# Patient Record
Sex: FEMALE | Race: OTHER | URBAN - METROPOLITAN AREA
[De-identification: names, ages, dates, MRNs, and addresses within clinical notes are randomized per-mention and may not be internally consistent; named-entity substitution may affect disease eponyms.]

---

## 2021-12-13 ENCOUNTER — EMERGENCY (EMERGENCY)
Facility: HOSPITAL | Age: 56
LOS: 0 days | Discharge: HOME | End: 2021-12-14
Attending: EMERGENCY MEDICINE
Payer: MEDICAID

## 2021-12-13 VITALS — WEIGHT: 130.07 LBS | HEIGHT: 61 IN

## 2021-12-13 DIAGNOSIS — Z98.891 HISTORY OF UTERINE SCAR FROM PREVIOUS SURGERY: Chronic | ICD-10-CM

## 2021-12-13 DIAGNOSIS — K31.89 OTHER DISEASES OF STOMACH AND DUODENUM: ICD-10-CM

## 2021-12-13 DIAGNOSIS — R07.9 CHEST PAIN, UNSPECIFIED: ICD-10-CM

## 2021-12-13 DIAGNOSIS — R11.2 NAUSEA WITH VOMITING, UNSPECIFIED: ICD-10-CM

## 2021-12-13 DIAGNOSIS — Z20.822 CONTACT WITH AND (SUSPECTED) EXPOSURE TO COVID-19: ICD-10-CM

## 2021-12-13 DIAGNOSIS — R10.13 EPIGASTRIC PAIN: ICD-10-CM

## 2021-12-13 PROCEDURE — 36415 COLL VENOUS BLD VENIPUNCTURE: CPT

## 2021-12-13 PROCEDURE — 96374 THER/PROPH/DIAG INJ IV PUSH: CPT | Mod: XU

## 2021-12-13 PROCEDURE — 81001 URINALYSIS AUTO W/SCOPE: CPT

## 2021-12-13 PROCEDURE — 99285 EMERGENCY DEPT VISIT HI MDM: CPT

## 2021-12-13 PROCEDURE — 87186 SC STD MICRODIL/AGAR DIL: CPT

## 2021-12-13 PROCEDURE — 74177 CT ABD & PELVIS W/CONTRAST: CPT | Mod: MA

## 2021-12-13 PROCEDURE — 99284 EMERGENCY DEPT VISIT MOD MDM: CPT | Mod: 25

## 2021-12-13 PROCEDURE — 76705 ECHO EXAM OF ABDOMEN: CPT | Mod: 26

## 2021-12-13 PROCEDURE — 80053 COMPREHEN METABOLIC PANEL: CPT

## 2021-12-13 PROCEDURE — 96375 TX/PRO/DX INJ NEW DRUG ADDON: CPT

## 2021-12-13 PROCEDURE — 87086 URINE CULTURE/COLONY COUNT: CPT

## 2021-12-13 PROCEDURE — 85025 COMPLETE CBC W/AUTO DIFF WBC: CPT

## 2021-12-13 PROCEDURE — U0003: CPT

## 2021-12-13 PROCEDURE — 93005 ELECTROCARDIOGRAM TRACING: CPT

## 2021-12-13 PROCEDURE — U0005: CPT

## 2021-12-13 PROCEDURE — 76705 ECHO EXAM OF ABDOMEN: CPT

## 2021-12-13 PROCEDURE — 93010 ELECTROCARDIOGRAM REPORT: CPT

## 2021-12-13 PROCEDURE — 74177 CT ABD & PELVIS W/CONTRAST: CPT | Mod: 26,MA

## 2021-12-13 PROCEDURE — 83690 ASSAY OF LIPASE: CPT

## 2021-12-13 RX ORDER — MORPHINE SULFATE 50 MG/1
4 CAPSULE, EXTENDED RELEASE ORAL ONCE
Refills: 0 | Status: DISCONTINUED | OUTPATIENT
Start: 2021-12-13 | End: 2021-12-13

## 2021-12-13 RX ORDER — ONDANSETRON 8 MG/1
1 TABLET, FILM COATED ORAL
Qty: 20 | Refills: 0
Start: 2021-12-13

## 2021-12-13 RX ORDER — ONDANSETRON 8 MG/1
4 TABLET, FILM COATED ORAL ONCE
Refills: 0 | Status: COMPLETED | OUTPATIENT
Start: 2021-12-13 | End: 2021-12-13

## 2021-12-13 RX ORDER — SODIUM CHLORIDE 9 MG/ML
1000 INJECTION INTRAMUSCULAR; INTRAVENOUS; SUBCUTANEOUS ONCE
Refills: 0 | Status: COMPLETED | OUTPATIENT
Start: 2021-12-13 | End: 2021-12-13

## 2021-12-13 RX ADMIN — MORPHINE SULFATE 4 MILLIGRAM(S): 50 CAPSULE, EXTENDED RELEASE ORAL at 20:52

## 2021-12-13 RX ADMIN — SODIUM CHLORIDE 2000 MILLILITER(S): 9 INJECTION INTRAMUSCULAR; INTRAVENOUS; SUBCUTANEOUS at 20:53

## 2021-12-13 RX ADMIN — ONDANSETRON 4 MILLIGRAM(S): 8 TABLET, FILM COATED ORAL at 20:52

## 2021-12-13 NOTE — ED STATDOCS - PROGRESS NOTE DETAILS
Labs and imaging findings reviewed with patient with assistance from  ID [id number]. Pt states she feels much better following ED management. CT expressed concern for malignancy, patient made aware. States her last endoscopy was 28 years ago in [place]. Advised diagnosis of cancer requires endoscopy and biopsy to confirm. Will provide GI cocktail for home, GI referral. Patient is agreeable to plan. Will dc home at this time. - Toan Melo PA-C

## 2021-12-13 NOTE — ED STATDOCS - CLINICAL SUMMARY MEDICAL DECISION MAKING FREE TEXT BOX
Will get CBC, CMP, and lipase. Also will get CT. Will get labs including CBC, CMP,  lipase, and COVID swab, will get CT & US, pain control, and reassess.

## 2021-12-13 NOTE — ED STATDOCS - CARE PROVIDER_API CALL
Kiran Escamilla)  Gastroenterology; Internal Medicine  [address] [place] [address]  Phone: [phone]  Fax: [phone]  Follow Up Time:

## 2021-12-13 NOTE — ED STATDOCS - PATIENT PORTAL LINK FT
You can access the FollowMyHealth Patient Portal offered by [facility] by registering at the following website: [email or web address]. By joining [employer]’s FollowMyHealth portal, you will also be able to view your health information using other applications (apps) compatible with our system.

## 2021-12-13 NOTE — ED STATDOCS - GASTROINTESTINAL, MLM
abdomen soft, significant tenderness in epigastric area, mild tenderness in LUQ & RUQ, and non-distended. Bowel sounds present.

## 2021-12-13 NOTE — ED STATDOCS - ATTENDING CONTRIBUTION TO CARE
I, Christopher Stewart, performed the initial face to face bedside interview with this patient regarding history of present illness, review of symptoms and relevant past medical, social and family history.  I completed an independent physical examination.  I was the initial provider who evaluated this patient. I have signed out the follow up of any pending tests (i.e. labs, radiological studies) to the ACP.  I have communicated the patient’s plan of care and disposition with the ACP.  The history, relevant review of systems, past medical and surgical history, medical decision making, and physical examination was documented by the scribe in my presence and I attest to the accuracy of the documentation.

## 2021-12-13 NOTE — ED STATDOCS - NSFOLLOWUPINSTRUCTIONS_ED_ALL_ED_FT
TAKE MEDICATIONS AS PRESCRIBED. FOLLOW UP WITH GASTROENTEROLOGY (GI) FOR FURTHER EVALUATION WHICH MAY INCLUDE ENDOSCOPY. RETURN TO EMERGENCY DEPARTMENT IF SYMPTOMS WORSEN.     TOME LOS MEDICAMENTOS SEGÚN LO PRESCRITO. SEGUIMIENTO CON GASTROENTEROLOGÍA (IG) PARA SHARIF EVALUACIÓN ADICIONAL QUE PUEDE INCLUIR ENDOSCOPIA. REGRESE AL DEPARTAMENTO DE EMERGENCIAS SI LOS SÍNTOMAS EMPEORAN.       Cáncer de estómago    Stomach Cancer       El cáncer de estómago también se conoce sharon cáncer gástrico. Es un crecimiento anormal de células cancerosas (malignas) en el estómago.      ¿Cuáles son las causas?    La causa exacta del cáncer de estómago no se conoce.      ¿Qué incrementa el riesgo?  Es más probable que tenga esta afección si:  •Tiene más de 65 años de edad.      •Es varón.      •Consume sharif dieta que incluye gran cantidad de alimentos ahumados, salados, o encurtidos.      •Consume algún producto que contenga tabaco, incluidos cigarrillos, tabaco de mascar o cigarrillos electrónicos.      •Mitzi alcohol en exceso.      •Tiene sobrepeso.    •Tiene antecedentes de lo siguiente:  •Cirugía de estómago.      •Gastritis crónica.      •Pólipos estomacales.      •Anemia perniciosa.      •Tiene alguno de los siguientes:  •Sharif infección estomacal por H. pylori.      •Antecedentes familiares de cáncer de estómago.      •Tyrone sanguíneo A.      •Sharif infección por el virus de Seth–Barr (VEB).      •Inmunodeficiencia común variable (IDCV).        •Trabaja en condiciones que lo exponen al carbón, el metal o la goma.        ¿Cuáles son los signos o síntomas?  Entre los síntomas, se pueden incluir los siguientes:  •Pérdida del apetito.      •Sensación de saciedad después de ingerir sharif comida pequeña.      •Dificultad para tragar.      •Náuseas.      •Dolor en el abdomen, generalmente, por arriba del ombligo.      •Gases en exceso o flatulencias.      •Acidez estomacal y empacho.      •Hinchazón o acumulación de líquido en el abdomen.      •Baja de peso sin proponérselo.      •Vómitos. Pueden incluir vómitos con jayesh.      •Tiene jayesh en la materia fecal (heces).      •Ney cantidad de glóbulos rojos (anemia).      •Fatiga.        ¿Cómo se diagnostica?  Esta afección se puede diagnosticar en función de lo siguiente:  •Los síntomas y los antecedentes médicos.      •Un examen físico. [place] puede incluir someterse a un procedimiento en el que un tubo con sharif chris y sharif cámara en el extremo se introduce a través de la boca y se mueve hacia abajo por la garganta hacia el estómago (examen endoscópico).      •Análisis de jyaesh.      •Un procedimiento en el cual se ingiere sharif solución (bario) antes de la realización de las radiografías para luego evaluar el estómago y otras estructuras (ingestión de bario). Esta sustancia aparece claramente en las radiografías, lo que le facilita al médico la detección de posibles problemas.      •Estudios de diagnóstico por imágenes, sharon sharif exploración por tomografía computarizada (TC), sharif resonancia magnética (RM), radiografías o sharif tomografía por emisión de positrones (TEP).      •Extracción de sharif muestra de células de estómago para análisis y detección del cáncer (biopsia).      El cáncer se evalúa (estadifica) para determinar whitt gravedad y cuánto se ha diseminado (ha hecho metástasis).      ¿Cómo se trata?  El tratamiento del cáncer de estómago depende del tipo y el estadio de la enfermedad. El tratamiento puede incluir connie o más de los siguientes:  •Cirugía para extirpar todo lo que sea posible del tumor (gastrectomía). Esta cirugía también puede involucrar la extirpación de los ganglios linfáticos cercanos que se examinarán para detectar células cancerosas.      •Medicamentos que destruyen las células cancerosas (quimioterapia).      •El uso de alin de veronica energía que destruyen las células cancerosas (radioterapia).      •Terapia dirigida. Esta terapia apunta a partes específicas de las células cancerosas y la liz alrededor de ellas para bloquear el crecimiento y la propagación del cáncer. La terapia dirigida puede ayudar a limitar el daño a las células sanas.      •Medicamentos que ayudan al sistema que combate las enfermedades (sistema inmunitario) a atacar a las células cancerosas (inmunoterapia).        Siga estas instrucciones en whitt casa:    Comida y bebida     •Algunos de los tratamientos podrían afectar whitt apetito y whitt capacidad para digerir determinados alimentos. Si tiene problemas para comer o no tiene apetito, solicite sharif nicole con un especialista en dieta y nutrición (nutricionista).    •Si usted tiene efectos secundarios que afectan svetlana hábitos alimenticios, puede ser útil lo siguiente:  •Coma con frecuencia pequeñas raciones y refrigerios.      •Annemarie batidos o suplementos con alto contenido de nutrientes y calorías.      •Coma alimentos que mark blandos y fáciles de ingerir.      •Evite los alimentos calientes, condimentados o difíciles de tragar.      •Siga las indicaciones del médico respecto de las restricciones en las comidas o las bebidas. Es posible que deba evitar o comer menos de estos alimentos:  •Carne mihaela.      •Yamile procesadas, sharon fiambres.      •Alimentos salados.      •Alimentos ahumados.      •Alimentos encurtidos.        • No annemarie alcohol.      Instrucciones generales     • No consuma ningún producto que contenga nicotina o tabaco, sharon cigarrillos y cigarrillos electrónicos. Si necesita ayuda para dejar de consumir estos productos, consulte al médico.      •Use los medicamentos de venta claudia y los recetados solamente sharon se lo haya indicado el médico.      •Considere la posibilidad de unirse a un tyrone de apoyo para personas a quienes les shore diagnosticado cáncer de estómago.      •Coopere con el médico para controlar los efectos secundarios del tratamiento.      •Concurra a todas las visitas de seguimiento sharon se lo haya indicado el médico. [place] es importante.        Dónde buscar más información    •American Cancer Society (Sociedad Estadounidense del Cáncer): www.cancer.org      •NCI (National Cancer [place] [Instituto Nacional del Cáncer]): www.cancer.gov        Comuníquese con un médico si:    •Tiene fiebre.      •Tiene dificultad para comer.      •Tiene problemas con los medicamentos.      •Continúa perdiendo peso sin proponérselo.      •Tiene náuseas, diarrea, sudoración, y piel enrojecida (rubor) después de comer (síndrome de evacuación gástrica rápida).        Solicite ayuda de inmediato si:  •Tiene alguno de los siguientes problemas que no mejora con los medicamentos:  •Dolor.      •Náuseas.      •Vómitos.      •Diarrea.        •Siente dolor intenso.      •Vomita jayesh o material de color yovana que parece granos de café.      •Tiene dificultad para respirar.      •Se desmaya.        Resumen    •El cáncer de estómago también se conoce sharon cáncer gástrico. Es un crecimiento anormal de células cancerosas (malignas) en el estómago.      •El cáncer se evalúa (estadifica) para determinar whitt gravedad y cuánto se ha diseminado (ha hecho metástasis).      •Coopere con el médico para controlar los efectos secundarios del tratamiento.      •Considere la posibilidad de unirse a un tyrone de apoyo para personas a quienes les shore diagnosticado cáncer de estómago.      Esta información no tiene sharon fin reemplazar el consejo del médico. Asegúrese de hacerle al médico cualquier pregunta que tenga.      Endoscopía veronica en los adultos    Upper Endoscopy, Adult    Sharif endoscopía veronica es un procedimiento que permite observar dentro de la porción veronica del tracto GI (gastrointestinal). La porción veronica del tracto GI se compone de lo siguiente:  •La parte del cuerpo que transporta los alimentos desde la boca hasta el estómago (esófago).      •El estómago.      •La primera parte del intestino gustafson (duodeno).      Lindsey procedimiento también se conoce sharon esofagogastroduodenoscopia (EGD) o gastrostomía. En lindsey procedimiento, el médico introduce un tubo gustafson y flexible (endoscopio) a través de la boca y por el esófago, hasta el estómago. Se fija sharif pequeña cámara al extremo del tubo. La cámara captura imágenes que se reproducen en un monitor de la phiolmena de examen. Gordon lindsey procedimiento, el médico también podría extraer sharif pequeña porción de tejido para enviarla a un laboratorio a que la examinen con un microscopio (biopsia).  El médico podría realizar sharif endoscopía veronica para diagnosticar distintos tipos de cáncer en la porción veronica del tracto GI. Es posible que le realicen lindsey procedimiento para hallar la causa de ciertas afecciones, sharon:  •Dolor de estómago.      •Acidez estomacal.      •Dolor o problemas al tragar.      •Náuseas y vómitos.      •Hemorragia estomacal.      •Úlceras estomacales.        Informe al médico acerca de lo siguiente:    •Cualquier alergia que tenga.      •Todos los medicamentos que utiliza, incluidos vitaminas, hierbas, gotas oftálmicas, cremas y medicamentos de venta claudia.      •Cualquier problema previo que usted o algún miembro de whitt hernesto haya tenido con los anestésicos.      •Cualquier trastorno de la jayesh que tenga.      •Cirugías a las que se haya sometido.      •Cualquier afección médica que tenga.      •Si está embarazada o podría estarlo.        ¿Cuáles son los riesgos?  En general, se trata de un procedimiento seguro. Sin embargo, pueden ocurrir complicaciones, por ejemplo:  •Infección.      •Sangrado.      •Reacciones alérgicas a los medicamentos.      •Un desgarro u orificio (perforación) del esófago, el estómago o el duodeno.        ¿Qué ocurre antes del procedimiento?      Hidratación   Siga las indicaciones del médico acerca de mantenerse hidratado, las cuales pueden incluir lo siguiente:  •Hasta 2 horas antes del procedimiento, puede beber líquidos transparentes, sharon agua, jugos de fruta sin pulpa, café yovana y té solo.      Restricciones en las comidas y bebidas   Siga las indicaciones del médico respecto de las comidas y bebidas, las cuales pueden incluir lo siguiente:  •8 horas antes del procedimiento, no coma alimentos pesados, por ejemplo, yamile, alimentos con alto contenido graso o fritos.      •6 horas antes del procedimiento, deje de ingerir comidas o alimentos livianos, sharon tostadas o cereales.      •6 horas antes del procedimiento, deje de roshni leche o bebidas que contengan leche.      •2 horas antes del procedimiento, deje de beber líquidos transparentes.      Medicamentos  Consulte al médico sobre:  •Cambiar o suspender los medicamentos que jose habitualmente. [place] es muy importante si jose medicamentos para la diabetes o anticoagulantes.      •Roshni medicamentos sharon aspirina e ibuprofeno. Estos medicamentos pueden tener un efecto anticoagulante en la jayesh. No tome estos medicamentos a menos que el médico se lo indique.      •Roshni medicamentos de venta claudia, vitaminas, hierbas y suplementos.      Indicaciones generales    •Char que alguien lo lleve a whitt casa desde el hospital o la clínica.      •Si se irá a whitt casa inmediatamente después del procedimiento, pídale a alguien que se quede con usted gordon 24 horas.      •Pregúntele al médico qué medidas se tomarán para ayudar a prevenir sharif infección.        ¿Qué ocurre gordon el procedimiento?     •Le colocarán sharif vía intravenosa en sharif de las venas.    •Pueden administrarle connie o más de los siguientes medicamentos:  •Un medicamento para ayudarlo a relajarse (sedante).      •Un medicamento para adormecer la garganta (anestesia local).        •Deberá recostarse del costado shy sobre sharif soto.      •El médico hará pasar el endoscopio a través de la boca y hacia el esófago.      •El médico utilizará el endoscopio para nidia el interior de whitt esófago, estómago y duodeno. Podrían realizarse biopsias.      •El endoscopio se retirará.      El procedimiento puede variar según el médico y el hospital.      ¿Qué ocurre después del procedimiento?    •Le controlarán la presión arterial, la frecuencia cardíaca, la frecuencia respiratoria y el nivel de oxígeno en la jayesh hasta que le den el veronica del hospital o la clínica.      • No conduzca gordon 24 horas si le administraron un sedante gordon el procedimiento.      •Cuando la garganta ya no esté adormecida, le pueden juan m líquido para beber.      •Es whitt responsabilidad retirar los resultados del procedimiento. Pregúntele al médico o a alguien del departamento donde se realice el procedimiento cuándo estarán listos los resultados.        Resumen    •Sharif endoscopía veronica es un procedimiento que permite observar dentro de la porción veronica del tracto GI.      •Gordon el procedimiento, le colocarán sharif vía intravenosa en sharif de las venas. Es posible que le administren un medicamento para ayudarlo a relajarse.      •Usarán un medicamento para anestesiarle la garganta.      •Le introducirán el endoscopio por la boca y lo llevarán hasta el esófago.      Esta información no tiene hsaron fin reemplazar el consejo del médico. Asegúrese de hacerle al médico cualquier pregunta que tenga.

## 2021-12-13 NOTE — ED ADULT TRIAGE NOTE - CHIEF COMPLAINT QUOTE
Pt presents to the ED c/o sudden onset chest pain/epigastric pain x 1 hour. Pt denies dizziness/SOB/radiation of pain. No other complaints at this time. Sent in for Stat EKG

## 2021-12-13 NOTE — ED ADULT NURSE NOTE - OBJECTIVE STATEMENT
pt. presents to ED with c/o of sudden epigastric pain 9/10 that started aprox. 45min PTA. pt. reports resting when pain started. pain constant now subsided. reports N/V PTA. last PO intake at 2pm. pt. denies chest pain, fevers, chills, diarrhea. other discomfort.

## 2021-12-13 NOTE — ED STATDOCS - OBJECTIVE STATEMENT
55 y/o female with a PMHx of  presents with a sudden onset of diffuse abd pain x 40 minutes. Pt also reports an episode of N/V today. Pt states her pain is worse in the epigastric area. Denies any radiation of pain. Denies diarrhea. Pt is allergic to Tylenol.  ID#[id number]

## 2021-12-14 VITALS
HEART RATE: 68 BPM | RESPIRATION RATE: 18 BRPM | OXYGEN SATURATION: 98 % | DIASTOLIC BLOOD PRESSURE: 76 MMHG | SYSTOLIC BLOOD PRESSURE: 141 MMHG | TEMPERATURE: 98 F

## 2021-12-14 LAB — SARS-COV-2 RNA SPEC QL NAA+PROBE: SIGNIFICANT CHANGE UP

## 2021-12-16 NOTE — ED POST DISCHARGE NOTE - DETAILS
intermediate urine culture, spoke with pt and sent rx for abx advised PMD or GYN f/u Kassandra Mai PA-C

## 2021-12-17 RX ORDER — NITROFURANTOIN MACROCRYSTAL 50 MG
1 CAPSULE ORAL
Qty: 10 | Refills: 0
Start: 2021-12-17 | End: 2021-12-21

## 2022-01-24 PROBLEM — Z78.9 OTHER SPECIFIED HEALTH STATUS: Chronic | Status: ACTIVE | Noted: 2021-12-17

## 2022-01-28 ENCOUNTER — APPOINTMENT (OUTPATIENT)
Dept: GASTROENTEROLOGY | Facility: AMBULATORY MEDICAL SERVICES | Age: 57
End: 2022-01-28

## 2022-01-31 PROBLEM — Z00.00 ENCOUNTER FOR PREVENTIVE HEALTH EXAMINATION: Status: ACTIVE | Noted: 2022-01-31

## 2022-02-25 ENCOUNTER — APPOINTMENT (OUTPATIENT)
Dept: GASTROENTEROLOGY | Facility: AMBULATORY MEDICAL SERVICES | Age: 57
End: 2022-02-25
Payer: SELF-PAY

## 2022-02-25 ENCOUNTER — RESULT REVIEW (OUTPATIENT)
Age: 57
End: 2022-02-25

## 2022-02-25 PROCEDURE — 43239 EGD BIOPSY SINGLE/MULTIPLE: CPT

## 2022-06-07 NOTE — ED ADULT NURSE NOTE - BRAND OF COVID-19 VACCINATION
Please call the office to schedule a follow-up appointment in 1-2 weeks if you do not receive a call from your doctor.
[employer] dose 1 and 2

## 2023-06-18 ENCOUNTER — EMERGENCY (EMERGENCY)
Facility: HOSPITAL | Age: 58
LOS: 0 days | Discharge: HOME | End: 2023-06-18
Attending: EMERGENCY MEDICINE
Payer: MEDICAID

## 2023-06-18 VITALS
OXYGEN SATURATION: 96 % | TEMPERATURE: 98 F | RESPIRATION RATE: 18 BRPM | SYSTOLIC BLOOD PRESSURE: 124 MMHG | HEART RATE: 71 BPM | DIASTOLIC BLOOD PRESSURE: 69 MMHG

## 2023-06-18 VITALS — HEIGHT: 61 IN | WEIGHT: 165.35 LBS

## 2023-06-18 DIAGNOSIS — Z88.6 ALLERGY STATUS TO ANALGESIC AGENT: ICD-10-CM

## 2023-06-18 DIAGNOSIS — S09.90XA UNSPECIFIED INJURY OF HEAD, INITIAL ENCOUNTER: ICD-10-CM
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
## 2023-06-18 DIAGNOSIS — S01.01XA LACERATION WITHOUT FOREIGN BODY OF SCALP, INITIAL ENCOUNTER: ICD-10-CM

## 2023-06-18 DIAGNOSIS — Z98.891 HISTORY OF UTERINE SCAR FROM PREVIOUS SURGERY: Chronic | ICD-10-CM
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
## 2023-06-18 PROCEDURE — 12002 RPR S/N/AX/GEN/TRNK2.6-7.5CM: CPT

## 2023-06-18 PROCEDURE — 99283 EMERGENCY DEPT VISIT LOW MDM: CPT | Mod: 25

## 2023-06-18 NOTE — ED STATDOCS - PROGRESS NOTE DETAILS
patient seen and evaluated with ED attending at intake.  laceration repaired, wound care reviewed, patient tolerated well -Antoni Rocha PA-C

## 2023-06-18 NOTE — ED STATDOCS - CLINICAL SUMMARY MEDICAL DECISION MAKING FREE TEXT BOX
Pt presents w/ scalp lac, accidently fell into side view mirror, no further head injury or LOC. Neuro intact, no need for imaging. Will need lac irrigation, repair, and follow up.

## 2023-06-18 NOTE — ED STATDOCS - PATIENT PORTAL LINK FT
You can access the FollowMyHealth Patient Portal offered by [facility] by registering at the following website: [email or web address]. By joining [employer]’s FollowMyHealth portal, you will also be able to view your health information using other applications (apps) compatible with our system.

## 2023-06-18 NOTE — ED ADULT NURSE NOTE - NSFALLUNIVINTERV_ED_ALL_ED
Bed/Stretcher in lowest position, wheels locked, appropriate side rails in place/Call bell, personal items and telephone in reach/Instruct patient to call for assistance before getting out of bed/chair/stretcher/Non-slip footwear applied when patient is off stretcher/[place] to call system/Physically safe environment - no spills, clutter or unnecessary equipment/Purposeful proactive rounding/Room/bathroom lighting operational, light cord in reach

## 2023-06-18 NOTE — ED STATDOCS - NSFOLLOWUPINSTRUCTIONS_ED_ALL_ED_FT
Cuidado de las grapas quirúrgicas    LO QUE NECESITA SABER:    ¿Cómo cuido de mi herida?    Limpieza:  Usted podría ducharse en 24 horas. No sumerja trinh herida bajo el agua.    Lávese la herida con mucho cuidado con agua tibia y jabón a diario. Luego séquela suavemente. No cubra la herida, a menos que así lo indique trinh médico.    Es probable que usted también necesite limpiarse la herida con sharif mezcla de peróxido de hidrógeno y agua. Pregunte a trinh médico cómo hacerlo.    No aplique ningún ungüento o crema en la herida, a menos que así se lo indique trinh médico.    Eleve:  Descanse el brazo o pierna que tiene la herida sobre almohadas de manera que quede elevado por encima del nivel de trinh corazón Char esto tan a menudo sharon sea posible cruz 2 días. [place] va a disminuir inflamación y el dolor.      Minimice las cicatrices:  Evite que la chris del sol le pegue en la herida para así evitar que la cicatriz empeore.    ¿Cuándo nancy programar sharif nicole de seguimiento con mi médico?Es probable que usted necesite ir donde trinh médico para que le revise la herida 3 días después de que le hayan colocado las grapas. Pregúntele a trinh médico cuando debe regresar para que le extraigan las grapas. Trinh médico procederá a extraerle las grapas tan pronto sharon sea posible para reducir la cicatriz. Las grapas colocadas en la shi podrían removerse en cuestión de 3 a 5 días. Las grapas en el cuero cabelludo, brazo y pierna podrían removerse dentro de 7 a 10 días. Las grapas colocadas en articulaciones, darling de la mano y pies generalmente se extraen dentro de 10 a 14 días.    ¿Cómo se extraen las grapas?    Se utilizará un extractor de grapas quirúrgicasse usará para extraer las grapas. Trinh médico colocará el instrumento por debajo de cada grapa, apretará la manija y luego con mucho cuidado procederá a sacar la grapa.    Cinta médicase colocará en la herida sharif vez que se quitan las grapas. [place] le ayudará a mantener cerrada la herida. La cinta adhesiva médica se caerá por sí zach después de varios días.  ¿Cuándo nancy comunicarme con mi médico?    Usted tiene enrojecimiento, dolor, inflamación o pus que drena de la herida.    Svetlana medicamentos para el dolor no le alivian el dolor.    Tiene fiebre de 101 ºF (38.5 °C) o más.    Le sale olor de la herida.    Usted tiene preguntas o inquietudes acerca de trinh condición o cuidado.  ¿Cuándo nancy buscar atención inmediata?    Trinh herida se vuelve a abrir.    Usted tiene unas sen lopez en la piel que salen de trinh herida.    Usted tiene dolor o vómito severos.  ACUERDOS SOBRE TRINH CUIDADO:    Usted tiene el derecho de ayudar a planear trinh cuidado. Aprenda todo lo que pueda sobre trinh condición y sharon darle tratamiento. Discuta svetlana opciones de tratamiento con svetlana médicos para decidir el cuidado que usted desea recibir. Usted siempre tiene el derecho de rechazar el tratamiento.

## 2023-06-18 NOTE — ED PROCEDURE NOTE - CPROC ED INFORMED CONSENT1
Benefits, risks, and possible complications of procedure explained to patient/caregiver who verbalized understanding and gave verbal consent. delayed

## 2023-06-18 NOTE — ED STATDOCS - NS ED ROS FT
Constitutional: No fevers, chills, or sweats.  Cardiac: No chest pain, exertional dyspnea, orthopnea  Respiratory: No shortness of breath, no cough  GI: No abdominal pain, no N/V/D  Neuro: No headaches, no neck pain/stiffness, no numbness  Skin: +forehead lac  All other systems reviewed and are negative unless otherwise stated in the HPI.

## 2023-06-18 NOTE — ED STATDOCS - PHYSICAL EXAMINATION
General: AAOx3, NAD  HEENT: NCAT  Cardiac: Normal rate and rhythym, no murmurs, normal peripheral perfusion  Respiratory: Normal rate and effort. CTAB  GI: Soft, nondistended, nontender  Neuro: No focal deficits. BROWN equally x4, sensation to light touch intact throughout  MSK: FROMx4, no focal bony tenderness, no peripheral edema  Skin: 3cm lac on vertex of scalp, no raccoon eyes or tenderness, no cervical spine tenderness

## 2023-06-18 NOTE — ED STATDOCS - PRINCIPAL DIAGNOSIS
Laceration of scalp Advancement Flap (Double) Text: The defect edges were debeveled with a #15 scalpel blade.  Given the location of the defect and the proximity to free margins a double advancement flap was deemed most appropriate.  Using a sterile surgical marker, the appropriate advancement flaps were drawn incorporating the defect and placing the expected incisions within the relaxed skin tension lines where possible.    The area thus outlined was incised deep to adipose tissue with a #15 scalpel blade.  The skin margins were undermined to an appropriate distance in all directions utilizing iris scissors.

## 2023-06-18 NOTE — ED PROCEDURE NOTE - CPROC ED TIME OUT STATEMENT1
“Patient's name, , procedure and correct site were confirmed during the [facility] Timeout.” normal balance

## 2023-06-18 NOTE — ED ADULT TRIAGE NOTE - CHIEF COMPLAINT QUOTE
pt presents to ed for evaluation- pt was getting out of car and missed step and hit her head onto side mirror- laceration on top of head, bleeding controlled.   denies loc or blood thinner use

## 2023-06-18 NOTE — ED STATDOCS - NS_ ATTENDINGSCRIBEDETAILS _ED_A_ED_FT
I, Steven Tyler MD,  performed the initial face to face bedside interview with this patient regarding history of present illness, review of symptoms and relevant past medical, social and family history.  I completed an independent physical examination.  I was the initial provider who evaluated this patient. I have signed out the follow up of any pending tests (i.e. labs, radiological studies) to the [facility].  I have communicated the patient’s plan of care and disposition with the [facility].  The history, relevant review of systems, past medical and surgical history, medical decision making, and physical examination was documented by the scribe in my presence and I attest to the accuracy of the documentation.

## 2023-06-18 NOTE — ED STATDOCS - ATTENDING APP SHARED VISIT CONTRIBUTION OF CARE
I, Steven Tyler MD, personally saw the patient with [facility].  I have personally performed a face to face diagnostic evaluation on this patient.  I have reviewed the [facility] note and agree with the history, exam, and plan of care, except as noted.

## 2023-06-18 NOTE — ED STATDOCS - OBJECTIVE STATEMENT
58 y/o female w/ no pertinent PMHx presents to the ED s/p head injury. Pt reports he was getting out of a car, tripped and accidently hit her head on the plastic side of the mirror. Pt noted to have laceration to top of head, no active bleeding, denies LOC. Pt not on blood thinners. Denies fall, n/v, or change in vision. No other complaints at this time. Allergies: Tylenol. Pt tetanus UTD.

## 2023-10-26 NOTE — ED ADULT NURSE NOTE - ISOLATION TYPE:
----- Message from Willy Murillo sent at 10/26/2023 12:08 PM EDT -----  Patient is Returning a call from [place] regarding results. None